# Patient Record
(demographics unavailable — no encounter records)

---

## 2025-01-03 NOTE — HISTORY OF PRESENT ILLNESS
[FreeTextEntry1] : 37-year-old woman, with a past medical history of anemia, presenting for establishment of cardiovascular care.  Patient has chronically experienced infrequent, momentary palpitations; which she describes as either a "skipped beat" or "fast heartbeats."  Recently, she visited an urgent care center with an upper respiratory infection and was found to have a heart rate of 104 bpm on electrocardiogram; therefore was instructed to see a cardiologist.  She otherwise denies chest pain, shortness of breath/dyspnea on exertion, dizziness/loss of consciousness, paroxysmal nocturnal dyspnea, orthopnea, headaches, abdominal pain, and lower extremity swelling.

## 2025-01-03 NOTE — PHYSICAL EXAM
[No Acute Distress] : no acute distress [Obese] : obese [Normal Venous Pressure] : normal venous pressure [No Carotid Bruit] : no carotid bruit [Normal S1, S2] : normal S1, S2 [No Murmur] : no murmur [No Rub] : no rub [No Gallop] : no gallop [Clear Lung Fields] : clear lung fields [Good Air Entry] : good air entry [No Respiratory Distress] : no respiratory distress  [Soft] : abdomen soft [Non Tender] : non-tender [No Masses/organomegaly] : no masses/organomegaly [Normal Bowel Sounds] : normal bowel sounds [No Edema] : no edema [No Cyanosis] : no cyanosis [No Clubbing] : no clubbing [No Varicosities] : no varicosities [Moves all extremities] : moves all extremities [No Focal Deficits] : no focal deficits [Normal Speech] : normal speech [Alert and Oriented] : alert and oriented [Normal memory] : normal memory

## 2025-01-03 NOTE — ASSESSMENT
[FreeTextEntry1] : 37-year-old woman, with a past medical history of anemia, presenting for establishment of cardiovascular care.    Palpitations: Patient has chronically experienced infrequent, momentary palpitations; which she describes as either a "skipped beat" or "fast heartbeats."  Recently, she visited an urgent care center with an upper respiratory infection and was found to have a heart rate of 104 bpm on electrocardiogram; therefore was instructed to see a cardiologist.  Another instance in which her heart rate was found to be elevated was after pulling her back, and being prescribed steroids. she otherwise denies chest pain, shortness of breath/dyspnea on exertion, dizziness/loss of consciousness, paroxysmal nocturnal dyspnea, orthopnea, headaches, abdominal pain, and lower extremity swelling. Physical exam and ECG today are unremarkable.  Review of recent labs does not demonstrate any gross thyroid dysfunction, anemia or electrolyte disturbance that would account for tachyarrhythmias. - transthoracic echocardiogram ordered today - deferring mobile cardiac telemetry at this time given current normal electrocardiogram and other inciting reasons (upper respiratory infection) for previous tachycardia - patient instructed to contact me and/or seek immediate medical attention if symptoms recur or worsen  HLD: Latest lipid profile on June 18, 2024 with a total cholesterol 161, high density lipoprotein 42, and low-density protein 103.  BMI 44.78 kg/m. - deferring medical therapy at this time in favor of lifestyle interventions (diet and exercise) - weight loss counseling - increase aerobic exercise as tolerated to aim for approximately 30 minutes of activity 5 days a week - heart healthy diet low in sodium, sugars and saturated fats and high in fruits, vegetables and whole grains - will reassess lipid panel in 3 months after consistent lifestyle modification, and readdress potential medical therapy if there is no meaningful change in the lipid panel at that time  F/u s/p TTE.

## 2025-02-14 NOTE — CARDIOLOGY SUMMARY
[de-identified] : 1/3/25: NSR at 99 bpm  [de-identified] : 1/9/25: 1. Left ventricular cavity is normal in size. Left ventricular wall thickness is normal. Left ventricular systolic function is normal with an ejection fraction visually estimated at 60 to 65 %. There are no regional wall motion abnormalities seen. 2. Normal left ventricular diastolic function, with normal left ventricular filling pressure. 3. Normal right ventricular cavity size, with normal wall thickness, and normal right ventricular systolic function. 4. No significant valvular disease.

## 2025-02-14 NOTE — HISTORY OF PRESENT ILLNESS
[FreeTextEntry1] : 37-year-old woman, with a past medical history of anemia, presenting for establishment of cardiovascular care.  Patient has chronically experienced infrequent, momentary palpitations; which she describes as either a "skipped beat" or "fast heartbeats."  Recently, she visited an urgent care center with an upper respiratory infection and was found to have a heart rate of 104 bpm on electrocardiogram; therefore was instructed to see a cardiologist.  She otherwise denies chest pain, shortness of breath/dyspnea on exertion, dizziness/loss of consciousness, paroxysmal nocturnal dyspnea, orthopnea, headaches, abdominal pain, and lower extremity swelling.   decr in palpitations - lifestyle modifications (diet and exercise)  - weight loss counseling - increase aerobic exercise as tolerated to aim for approximately 30 minutes of activity 5 days a week - heart healthy diet low in sodium, sugars and saturated fats and high in fruits, vegetables and whole grains  f/u in 1 year or sooner PRN  cholesterol f/u atnex physical with PMD and will review with patient thereafter